# Patient Record
Sex: FEMALE | Race: WHITE | Employment: FULL TIME | URBAN - METROPOLITAN AREA
[De-identification: names, ages, dates, MRNs, and addresses within clinical notes are randomized per-mention and may not be internally consistent; named-entity substitution may affect disease eponyms.]

---

## 2019-06-04 ENCOUNTER — HOSPITAL ENCOUNTER (OUTPATIENT)
Dept: GENERAL RADIOLOGY | Age: 29
Discharge: HOME | End: 2019-06-04
Payer: COMMERCIAL

## 2019-06-04 DIAGNOSIS — N97.0 INFERTILITY ASSOCIATED WITH ANOVULATION: ICD-10-CM

## 2019-06-04 PROCEDURE — 74740 X-RAY FEMALE GENITAL TRACT: CPT

## 2019-06-04 PROCEDURE — 6360000004 HC RX CONTRAST MEDICATION: Performed by: OBSTETRICS & GYNECOLOGY

## 2019-06-04 RX ADMIN — IOHEXOL 20 ML: 240 INJECTION, SOLUTION INTRATHECAL; INTRAVASCULAR; INTRAVENOUS; ORAL at 12:32

## 2019-06-04 NOTE — OP NOTE
[facility]    Betadine applied to cervix. Balloon Catheter placed. Optiray 2000 dye injected. Fluoro reveals normal cavity and bilateral patent tubes.

## 2020-02-17 ENCOUNTER — HOSPITAL ENCOUNTER (OUTPATIENT)
Age: 30
Discharge: HOME | End: 2020-02-17
Attending: ADVANCED PRACTICE MIDWIFE | Admitting: ADVANCED PRACTICE MIDWIFE
Payer: COMMERCIAL

## 2020-02-17 VITALS
TEMPERATURE: 98.1 F | DIASTOLIC BLOOD PRESSURE: 73 MMHG | RESPIRATION RATE: 18 BRPM | HEART RATE: 95 BPM | SYSTOLIC BLOOD PRESSURE: 127 MMHG

## 2020-02-17 PROBLEM — O99.612 CONSTIPATION DURING PREGNANCY IN SECOND TRIMESTER: Status: ACTIVE | Noted: 2020-02-17

## 2020-02-17 PROBLEM — K59.00 CONSTIPATION DURING PREGNANCY IN SECOND TRIMESTER: Status: ACTIVE | Noted: 2020-02-17

## 2020-02-17 PROCEDURE — 99203 OFFICE O/P NEW LOW 30 MIN: CPT

## 2020-02-17 PROCEDURE — 2580000003 HC RX 258: Performed by: ADVANCED PRACTICE MIDWIFE

## 2020-02-17 PROCEDURE — 6370000000 HC RX 637 (ALT 250 FOR IP): Performed by: ADVANCED PRACTICE MIDWIFE

## 2020-02-17 PROCEDURE — 6360000002 HC RX W HCPCS: Performed by: ADVANCED PRACTICE MIDWIFE

## 2020-02-17 PROCEDURE — 96374 THER/PROPH/DIAG INJ IV PUSH: CPT

## 2020-02-17 PROCEDURE — 96360 HYDRATION IV INFUSION INIT: CPT

## 2020-02-17 RX ORDER — METOCLOPRAMIDE 10 MG/1
10 TABLET ORAL
Qty: 120 TABLET | Refills: 3 | Status: SHIPPED | OUTPATIENT
Start: 2020-02-17

## 2020-02-17 RX ORDER — SODIUM CHLORIDE, SODIUM LACTATE, POTASSIUM CHLORIDE, CALCIUM CHLORIDE 600; 310; 30; 20 MG/100ML; MG/100ML; MG/100ML; MG/100ML
INJECTION, SOLUTION INTRAVENOUS CONTINUOUS
Status: ACTIVE | OUTPATIENT
Start: 2020-02-17 | End: 2020-02-17

## 2020-02-17 RX ORDER — METOCLOPRAMIDE HYDROCHLORIDE 5 MG/ML
10 INJECTION INTRAMUSCULAR; INTRAVENOUS ONCE
Status: COMPLETED | OUTPATIENT
Start: 2020-02-17 | End: 2020-02-17

## 2020-02-17 RX ORDER — METOCLOPRAMIDE HYDROCHLORIDE 5 MG/ML
10 INJECTION INTRAMUSCULAR; INTRAVENOUS ONCE
Status: DISCONTINUED | OUTPATIENT
Start: 2020-02-17 | End: 2020-02-17

## 2020-02-17 RX ADMIN — SODIUM CHLORIDE, SODIUM LACTATE, POTASSIUM CHLORIDE, AND CALCIUM CHLORIDE: .6; .31; .03; .02 INJECTION, SOLUTION INTRAVENOUS at 10:21

## 2020-02-17 RX ADMIN — METOCLOPRAMIDE 10 MG: 5 INJECTION, SOLUTION INTRAMUSCULAR; INTRAVENOUS at 13:18

## 2020-02-17 RX ADMIN — LIDOCAINE HYDROCHLORIDE: 20 SOLUTION ORAL; TOPICAL at 14:21

## 2020-02-17 SDOH — HEALTH STABILITY: MENTAL HEALTH: HOW OFTEN DO YOU HAVE A DRINK CONTAINING ALCOHOL?: NEVER

## 2020-02-17 NOTE — FLOWSHEET NOTE
Pt reports she got \"a few small pieces out\" when up to the bathroom. Pt states she feels a little better and agreeable to go home. Bernardo Lopez CNM called and updated on results. Order to send soap suds enema home with pt and to have pt repeat tonight or tomorrow if no further results and continue taking dulcolax at home. If no results by tomorrow morning, pt to call office to make appt to be seen.

## 2020-02-17 NOTE — PLAN OF CARE
Problem: Pain:  Goal: Pain level will decrease  Description  Pain level will decrease  2/17/2020 1049 by Bart Wright RN  Outcome: Ongoing  2/17/2020 1048 by Bart Wright RN  Outcome: Ongoing  Goal: Control of acute pain  Description  Control of acute pain  2/17/2020 1049 by Bart Wright RN  Outcome: Ongoing  2/17/2020 1048 by Bart Wright RN  Outcome: Ongoing     Problem:  Bowel/Gastric:  Goal: Ability to achieve a regular elimination pattern will improve  Description  Ability to achieve a regular elimination pattern will improve  2/17/2020 1049 by Bart Wright RN  Outcome: Ongoing  2/17/2020 1048 by Bart Wright RN  Outcome: Ongoing     Problem: Nausea/Vomiting:  Goal: Absence of nausea/vomiting  Description  Absence of nausea/vomiting  Outcome: Ongoing

## 2020-02-17 NOTE — FLOWSHEET NOTE
Pt discharged to home undelivered in stable condition. Instructions reviewed. Pt verbalizes understanding.   To follow up at next scheduled appointment with [facility]

## 2020-02-17 NOTE — FLOWSHEET NOTE
Soap suds enema provided to pt and education given - pt verbalized understanding of how to use and when to repeat it. Pt states \" I was able to get up again and go a little more and now my belly is growling and I', hesitant to get in the car for a 45 min car ride in case I still need to go\". Discussed with pt she can stay for as long as needed until she is ready to go. Pt provided ice chips as requested on positioned onto left side. Pt to call out when ready for d/c.

## 2020-02-17 NOTE — FLOWSHEET NOTE
Pt states she wants to go home. States cramping is still present, but is not able to relax here and would feel better if able to sleep in own bed. Notified Laila BLAKE pt requesting discharge. Order received for pt to home home.

## 2020-02-17 NOTE — FLOWSHEET NOTE
PT arrived to triage D from home. Genene Later CNM called prior to arrival with orders to give 1 liter bolus of fluid as pt has been on zofran pump at home and [address] [place] [address] Way unable to come to house for fluids today.

## 2020-02-17 NOTE — FLOWSHEET NOTE
Pt reports her biggest complaint is her abd pain r/t constipation. Pt states she has tried MOM, stool softener, dulcolax po and suppository, and fleets enema with no relief. Michi Haile CNM called and informed and agreed to order for soap suds enema. Pt updated on order to offer and pt agreeable to enema. Order placed and central supply called to send to unit. Will complete IV bolus and then do enema.

## 2020-02-17 NOTE — PROGRESS NOTES
Department of Obstetrics and Gynecology  Triage Evaluation Note    SUBJECTIVE:  Debbie Cleary is a 30 y.o.,  at 18w4d who presents for abdominal pain. She denies vaginal bleeding, leakage of fluid, or contractions. She states the baby is  moving well. She denies fever, shortness of breath, palpitations on ROS. Reports vomiting approx nine times yesterday. Has been on German pump at home for the past 6 wks. Has had issues with constipation and cannot report the last time she had she had a full bowel movement. Stopped her Zofran pump yesterday due to constipation    I personally reviewed the past medical and surgical histories, as well as the problem list.    OBJECTIVE:  Vital Signs: /73   Pulse 95   Temp 98.1 °F (36.7 °C) (Oral)   Resp 18   Appearance/Psychiatric: alert and oriented X3  Constitutional: Appears well, no distress  Cardiovascular: She does not have edema. Respiratory:Respiratory effort is normal.  Gastrointestinal: soft, non tender, Gravid. The uterine size is equal to dates. Pelvic: Cervix cl/th/high retroverted uterus. +FHT    LABS:      ASSESSMENT AND PLAN:  30 y.o.    18wks  Hyperemsis  Constipation  P: Reports trying docolax, Miralax, MOM, Doculax suppositories and a Fleets enema at home. Soap suds enema here with some relief. Reglan 10mg IM given  GI cocktail now    Off Zofran pump for now. Start reglan 10mg. Will send reglan to pharmacy. The patient will follow up in 1 weeks. She was counseled to call or return for vaginal bleeding, regular contractions, leakage of fluid or decreased fetal movement.     Electronically signed by LUCAS Mckenna CNM on 2020 at 1:20 PM

## 2020-06-05 ENCOUNTER — HOSPITAL ENCOUNTER (OUTPATIENT)
Dept: VASCULAR LAB | Age: 30
Discharge: HOME | End: 2020-06-05
Payer: COMMERCIAL

## 2020-06-05 PROCEDURE — 93971 EXTREMITY STUDY: CPT

## 2022-09-07 LAB
ABO, EXTERNAL RESULT: NORMAL
HEP B, EXTERNAL RESULT: NEGATIVE
HIV, EXTERNAL RESULT: NORMAL
RPR, EXTERNAL RESULT: NORMAL
RUBELLA TITER, EXTERNAL RESULT: POSITIVE

## 2022-10-12 ENCOUNTER — HOSPITAL ENCOUNTER (OUTPATIENT)
Age: 32
Discharge: HOME | End: 2022-10-12
Attending: OBSTETRICS & GYNECOLOGY | Admitting: OBSTETRICS & GYNECOLOGY
Payer: COMMERCIAL

## 2022-10-12 VITALS
BODY MASS INDEX: 27.66 KG/M2 | RESPIRATION RATE: 18 BRPM | OXYGEN SATURATION: 99 % | WEIGHT: 162 LBS | SYSTOLIC BLOOD PRESSURE: 115 MMHG | HEIGHT: 64 IN | HEART RATE: 71 BPM | DIASTOLIC BLOOD PRESSURE: 68 MMHG | TEMPERATURE: 98.2 F

## 2022-10-12 PROCEDURE — 2580000003 HC RX 258: Performed by: OBSTETRICS & GYNECOLOGY

## 2022-10-12 PROCEDURE — 96361 HYDRATE IV INFUSION ADD-ON: CPT

## 2022-10-12 PROCEDURE — G0379 DIRECT REFER HOSPITAL OBSERV: HCPCS

## 2022-10-12 PROCEDURE — 99215 OFFICE O/P EST HI 40 MIN: CPT

## 2022-10-12 PROCEDURE — 96365 THER/PROPH/DIAG IV INF INIT: CPT

## 2022-10-12 PROCEDURE — 6360000002 HC RX W HCPCS: Performed by: OBSTETRICS & GYNECOLOGY

## 2022-10-12 PROCEDURE — G0378 HOSPITAL OBSERVATION PER HR: HCPCS

## 2022-10-12 RX ORDER — SODIUM CHLORIDE, SODIUM LACTATE, POTASSIUM CHLORIDE, CALCIUM CHLORIDE 600; 310; 30; 20 MG/100ML; MG/100ML; MG/100ML; MG/100ML
INJECTION, SOLUTION INTRAVENOUS ONCE
Status: COMPLETED | OUTPATIENT
Start: 2022-10-12 | End: 2022-10-12

## 2022-10-12 RX ORDER — DEXTROSE, SODIUM CHLORIDE, SODIUM LACTATE, POTASSIUM CHLORIDE, AND CALCIUM CHLORIDE 5; .6; .31; .03; .02 G/100ML; G/100ML; G/100ML; G/100ML; G/100ML
INJECTION, SOLUTION INTRAVENOUS ONCE
Status: COMPLETED | OUTPATIENT
Start: 2022-10-12 | End: 2022-10-12

## 2022-10-12 RX ADMIN — SODIUM CHLORIDE, POTASSIUM CHLORIDE, SODIUM LACTATE AND CALCIUM CHLORIDE: 600; 310; 30; 20 INJECTION, SOLUTION INTRAVENOUS at 16:05

## 2022-10-12 RX ADMIN — Medication 25 MG: at 16:39

## 2022-10-12 RX ADMIN — SODIUM CHLORIDE, SODIUM LACTATE, POTASSIUM CHLORIDE, CALCIUM CHLORIDE AND DEXTROSE MONOHYDRATE: 5; 600; 310; 30; 20 INJECTION, SOLUTION INTRAVENOUS at 16:32

## 2022-10-12 NOTE — FLOWSHEET NOTE
Pt is a  at 11.5wk IUP with c/o hyperemesis. Pt verbalized having same problem with last pregnancy. Pt stated that nausea started once pregnancy was confirmed and only got sick a few times. Pt verbalized N/V increased yesterday and \"cant even take water without it coming back up. \" Hx obtained and FHR doppler for 180's. Pt denies HA, upper gastric pain, bleeding, or leaking of fluid. IV started and bolusing per order. Pt aware of need to call a ride r/t phenergan ordered will make her sleepy. Pt verbalized understanding and will call a ride when ready for d/c home.

## 2022-10-12 NOTE — DISCHARGE INSTRUCTIONS
Home Undelivered Discharge Instructions    After completion of the medical screening evaluation, it has been determined that a medical emergency does not exist and the patient is not in active labor. Therefore, the patient may be discharged home. After Discharge Orders:            Diet:  normal diet as tolerated    Rest: normal activity as tolerated\        Diet/Activity/Restrictions:  Regular  Limit caffeine consumption  Increase your fluid intake  Eat small meals-but often. Rise from laying/sitting position to standing slowly. Avoid laying on your back, sidelying positions are best, left side is preferred. Weigh yourself daily and write down what you weigh. If you had a vaginal exam you may have some bloody mucous or brown vaginal discharge. If your doctor/midwife has ordered antibiotics, get it filled right away and take all of the medication as it has been prescribed. When to call your doctor: If you have severe back pain. Period-like cramps that may come and go. May feel like baby is balling up. Low, dull backache, not relieved by rest.  Pressure in your vagina or lower abdomen that may feel like the baby is pushing down. Change in the type or amount of vaginal discharge. Abdominal cramps that may be accompanied by diarrhea. 4-5 uterine contractions in one hour. Fluid leaking from your vagina (may or may not be bloody)  If you have vaginal bleeding. If you have a temperature of 100.6 or more. If your baby has a decrease in activity. Less than 5 times in an hour. If you feel you are not getting better or you are concerned. If you develop a headache, not resolved with your usual interventions. If you have changes in your vision (blurring or spots in your vision). If you have burning with urination, urgency or frequency. If you have pain in your abdomen, up by your ribs.                 General Instructions:    Nausea and Vomiting  Keep dry toast/crackers with you to munch on  Eat small frequent meals  Try to keep something in your stomach (don't let your stomach get empty)  Take your time getting up  Avoid smells that make you feel sick    Travel  Wear seatbelts or safety/lap belts  Walk frequently, every 1-2 hours  Wear clothing that does not constrict and comfortable shoes  Keep a light snack (e.g. Dry crackers) with you at all times to prevent nausea  Drink plenty of water, low sodium and noncaffeinated drinks. DO NOT take any medication that is not approved by your physician first    Swelling (Edema)  Avoid standing for long periods, keep legs up when you can  When resting, lie on your side (left side is best)  Limit the amount of salty foods you eat  Try to wear support hose as much as possible    Exercise  Avoid situations that would cause you to become overheated  Exercise outdoors only if the weather is reasonable and not too hot  Do not over exert yourself when you exercise  Drink plenty of fluids, especially water  Wear good support hose, bra and shoes when exercising    Varicose Veins  Try to keep your legs elevated when you are sitting  When lying down, keep your legs elevated  Do not stand for long periods of time  When wearing stockings or socks, make sure they are not too tight and bind your legs  Wear support hose/stockings at all times  If you have a job where you sit a lot, get up periodically and walk around      Other instructions: Do kick counts once a day on your baby starting in the 3rd trimester. Choose the time of day your baby is most active. Get in a comfortable lying or sitting position and time how long it takes to feel 10 kicks, twists, turns, swishes, or rolls.  Call your physician or midwife if there have not been 10 kicks in 1 hours    Call physician or midwife, return to Labor and Delivery, call 911, or go to the nearest Emergency Room if: increased leakage or fluid, decreased fetal movement, persistent low back pain or cramping, bleeding from vaginal area, difficulty urinating, pain with urination, difficulty breathing, new calf pain, persistent headache, vision change, or contractions. Fetal Movement Chart    A daily diary of your baby's movements provides useful information. Please allyn down anytime the baby moves during at least one convenient hour each day. Pick an hour when the baby is usually active. It is also important that you have a regular meal within two hours. Gurinder Nix on your left side, in this position the circulation to the baby is improved, and count the number of movements. If the baby moves less than 5-6 times in an hour, or you are concerned about you or your baby call your doctor or midwife.         Date Time Counts Total Date Time Counts Total

## 2022-10-12 NOTE — FLOWSHEET NOTE
Medication and IVF infused. Pt verbalized feeling better just tired. Pt ride on their way. IV d/kathie. After completion of the Medical Screening Evaluation, it has been determined that a medical emergency does not exist and the patient is not in active labor, and therefore the patient may be discharged home. Patient given triage discharge instructions. Patient instructed to resume regular diet as tolerated with small frequent meals and  normal activity. All pregnancy warning signs and symptoms reviewed. Fetal Kick counts reviewed and information provided regarding \"Count The Kicks\" eulogio. Patient aware of when to follow-up with provider and all questions answered.

## 2022-10-12 NOTE — FLOWSHEET NOTE
IV#1 in and D5LR hung with phenergan IVPB'ed in. Medication reviewed with pt. Pt verbalized understanding. Pt aware that medication will take 30min to infuse.

## 2023-03-31 LAB — GBS, EXTERNAL RESULT: NEGATIVE

## 2023-04-27 ENCOUNTER — PREP FOR PROCEDURE (OUTPATIENT)
Dept: OBGYN | Age: 33
End: 2023-04-27

## 2023-04-27 RX ORDER — ONDANSETRON 2 MG/ML
4 INJECTION INTRAMUSCULAR; INTRAVENOUS EVERY 6 HOURS PRN
Status: CANCELLED | OUTPATIENT
Start: 2023-04-27

## 2023-04-27 RX ORDER — MISOPROSTOL 100 UG/1
800 TABLET ORAL PRN
Status: CANCELLED | OUTPATIENT
Start: 2023-04-27

## 2023-04-27 RX ORDER — SODIUM CHLORIDE 0.9 % (FLUSH) 0.9 %
5-40 SYRINGE (ML) INJECTION EVERY 12 HOURS SCHEDULED
Status: CANCELLED | OUTPATIENT
Start: 2023-04-27

## 2023-04-27 RX ORDER — SODIUM CHLORIDE, SODIUM LACTATE, POTASSIUM CHLORIDE, AND CALCIUM CHLORIDE .6; .31; .03; .02 G/100ML; G/100ML; G/100ML; G/100ML
500 INJECTION, SOLUTION INTRAVENOUS PRN
Status: CANCELLED | OUTPATIENT
Start: 2023-04-27

## 2023-04-27 RX ORDER — SODIUM CHLORIDE, SODIUM LACTATE, POTASSIUM CHLORIDE, AND CALCIUM CHLORIDE .6; .31; .03; .02 G/100ML; G/100ML; G/100ML; G/100ML
1000 INJECTION, SOLUTION INTRAVENOUS PRN
Status: CANCELLED | OUTPATIENT
Start: 2023-04-27

## 2023-04-27 RX ORDER — SODIUM CHLORIDE 9 MG/ML
25 INJECTION, SOLUTION INTRAVENOUS PRN
Status: CANCELLED | OUTPATIENT
Start: 2023-04-27

## 2023-04-27 RX ORDER — CARBOPROST TROMETHAMINE 250 UG/ML
250 INJECTION, SOLUTION INTRAMUSCULAR PRN
Status: CANCELLED | OUTPATIENT
Start: 2023-04-27

## 2023-04-27 RX ORDER — SODIUM CHLORIDE 0.9 % (FLUSH) 0.9 %
5-40 SYRINGE (ML) INJECTION PRN
Status: CANCELLED | OUTPATIENT
Start: 2023-04-27

## 2023-04-27 RX ORDER — SODIUM CHLORIDE, SODIUM LACTATE, POTASSIUM CHLORIDE, CALCIUM CHLORIDE 600; 310; 30; 20 MG/100ML; MG/100ML; MG/100ML; MG/100ML
INJECTION, SOLUTION INTRAVENOUS CONTINUOUS
Status: CANCELLED | OUTPATIENT
Start: 2023-04-27

## 2023-04-27 RX ORDER — METHYLERGONOVINE MALEATE 0.2 MG/ML
200 INJECTION INTRAVENOUS PRN
Status: CANCELLED | OUTPATIENT
Start: 2023-04-27

## 2023-04-30 ENCOUNTER — ANESTHESIA (OUTPATIENT)
Dept: LABOR AND DELIVERY | Age: 33
End: 2023-04-30
Payer: COMMERCIAL

## 2023-04-30 ENCOUNTER — HOSPITAL ENCOUNTER (INPATIENT)
Age: 33
LOS: 3 days | Discharge: HOME | End: 2023-05-03
Attending: OBSTETRICS & GYNECOLOGY | Admitting: OBSTETRICS & GYNECOLOGY
Payer: COMMERCIAL

## 2023-04-30 ENCOUNTER — ANESTHESIA EVENT (OUTPATIENT)
Dept: LABOR AND DELIVERY | Age: 33
End: 2023-04-30
Payer: COMMERCIAL

## 2023-04-30 ENCOUNTER — APPOINTMENT (OUTPATIENT)
Dept: LABOR AND DELIVERY | Age: 33
End: 2023-04-30
Payer: COMMERCIAL

## 2023-04-30 PROBLEM — Z98.890 STATUS POST INDUCTION OF LABOR: Status: ACTIVE | Noted: 2023-04-30

## 2023-04-30 LAB
ABO + RH BLD: NORMAL
AMPHETAMINES UR QL SCN>1000 NG/ML: NORMAL
BARBITURATES UR QL SCN>200 NG/ML: NORMAL
BENZODIAZ UR QL SCN>200 NG/ML: NORMAL
BLD GP AB SCN SERPL QL: NORMAL
BUPRENORPHINE+NOR UR QL SCN: NORMAL
CANNABINOIDS UR QL SCN>50 NG/ML: NORMAL
COCAINE UR QL SCN: NORMAL
DEPRECATED RDW RBC AUTO: 13.2 % (ref 12.4–15.4)
DRUG SCREEN COMMENT UR-IMP: NORMAL
FENTANYL SCREEN, URINE: NORMAL
HCT VFR BLD AUTO: 34.4 % (ref 36–48)
HGB BLD-MCNC: 11.5 G/DL (ref 12–16)
MCH RBC QN AUTO: 30.3 PG (ref 26–34)
MCHC RBC AUTO-ENTMCNC: 33.6 G/DL (ref 31–36)
MCV RBC AUTO: 90.1 FL (ref 80–100)
METHADONE UR QL SCN>300 NG/ML: NORMAL
OPIATES UR QL SCN>300 NG/ML: NORMAL
OXYCODONE UR QL SCN: NORMAL
PCP UR QL SCN>25 NG/ML: NORMAL
PH UR STRIP: 6 [PH]
PLATELET # BLD AUTO: 135 K/UL (ref 135–450)
PMV BLD AUTO: 10.5 FL (ref 5–10.5)
RBC # BLD AUTO: 3.81 M/UL (ref 4–5.2)
WBC # BLD AUTO: 10.9 K/UL (ref 4–11)

## 2023-04-30 PROCEDURE — 86901 BLOOD TYPING SEROLOGIC RH(D): CPT

## 2023-04-30 PROCEDURE — 86780 TREPONEMA PALLIDUM: CPT

## 2023-04-30 PROCEDURE — 6370000000 HC RX 637 (ALT 250 FOR IP): Performed by: OBSTETRICS & GYNECOLOGY

## 2023-04-30 PROCEDURE — 2580000003 HC RX 258: Performed by: OBSTETRICS & GYNECOLOGY

## 2023-04-30 PROCEDURE — 59025 FETAL NON-STRESS TEST: CPT

## 2023-04-30 PROCEDURE — 86900 BLOOD TYPING SEROLOGIC ABO: CPT

## 2023-04-30 PROCEDURE — 1220000000 HC SEMI PRIVATE OB R&B

## 2023-04-30 PROCEDURE — 85027 COMPLETE CBC AUTOMATED: CPT

## 2023-04-30 PROCEDURE — 59200 INSERT CERVICAL DILATOR: CPT

## 2023-04-30 PROCEDURE — 86850 RBC ANTIBODY SCREEN: CPT

## 2023-04-30 PROCEDURE — 80307 DRUG TEST PRSMV CHEM ANLYZR: CPT

## 2023-04-30 RX ORDER — SODIUM CHLORIDE, SODIUM LACTATE, POTASSIUM CHLORIDE, AND CALCIUM CHLORIDE .6; .31; .03; .02 G/100ML; G/100ML; G/100ML; G/100ML
1000 INJECTION, SOLUTION INTRAVENOUS PRN
Status: DISCONTINUED | OUTPATIENT
Start: 2023-04-30 | End: 2023-05-03 | Stop reason: HOSPADM

## 2023-04-30 RX ORDER — MISOPROSTOL 200 UG/1
800 TABLET ORAL PRN
Status: DISCONTINUED | OUTPATIENT
Start: 2023-04-30 | End: 2023-05-03 | Stop reason: HOSPADM

## 2023-04-30 RX ORDER — METHYLERGONOVINE MALEATE 0.2 MG/ML
200 INJECTION INTRAVENOUS PRN
Status: DISCONTINUED | OUTPATIENT
Start: 2023-04-30 | End: 2023-05-03 | Stop reason: HOSPADM

## 2023-04-30 RX ORDER — SODIUM CHLORIDE 9 MG/ML
25 INJECTION, SOLUTION INTRAVENOUS PRN
Status: DISCONTINUED | OUTPATIENT
Start: 2023-04-30 | End: 2023-05-03 | Stop reason: HOSPADM

## 2023-04-30 RX ORDER — SODIUM CHLORIDE, SODIUM LACTATE, POTASSIUM CHLORIDE, AND CALCIUM CHLORIDE .6; .31; .03; .02 G/100ML; G/100ML; G/100ML; G/100ML
500 INJECTION, SOLUTION INTRAVENOUS PRN
Status: DISCONTINUED | OUTPATIENT
Start: 2023-04-30 | End: 2023-05-03 | Stop reason: HOSPADM

## 2023-04-30 RX ORDER — SODIUM CHLORIDE 0.9 % (FLUSH) 0.9 %
5-40 SYRINGE (ML) INJECTION EVERY 12 HOURS SCHEDULED
Status: DISCONTINUED | OUTPATIENT
Start: 2023-04-30 | End: 2023-05-03 | Stop reason: HOSPADM

## 2023-04-30 RX ORDER — SODIUM CHLORIDE, SODIUM LACTATE, POTASSIUM CHLORIDE, CALCIUM CHLORIDE 600; 310; 30; 20 MG/100ML; MG/100ML; MG/100ML; MG/100ML
INJECTION, SOLUTION INTRAVENOUS CONTINUOUS
Status: DISCONTINUED | OUTPATIENT
Start: 2023-04-30 | End: 2023-05-01 | Stop reason: SDUPTHER

## 2023-04-30 RX ORDER — SODIUM CHLORIDE 0.9 % (FLUSH) 0.9 %
5-40 SYRINGE (ML) INJECTION PRN
Status: DISCONTINUED | OUTPATIENT
Start: 2023-04-30 | End: 2023-05-03 | Stop reason: HOSPADM

## 2023-04-30 RX ORDER — CARBOPROST TROMETHAMINE 250 UG/ML
250 INJECTION, SOLUTION INTRAMUSCULAR PRN
Status: DISCONTINUED | OUTPATIENT
Start: 2023-04-30 | End: 2023-05-03 | Stop reason: HOSPADM

## 2023-04-30 RX ORDER — ONDANSETRON 2 MG/ML
4 INJECTION INTRAMUSCULAR; INTRAVENOUS EVERY 6 HOURS PRN
Status: DISCONTINUED | OUTPATIENT
Start: 2023-04-30 | End: 2023-05-03 | Stop reason: HOSPADM

## 2023-04-30 RX ADMIN — SODIUM CHLORIDE, POTASSIUM CHLORIDE, SODIUM LACTATE AND CALCIUM CHLORIDE: 600; 310; 30; 20 INJECTION, SOLUTION INTRAVENOUS at 23:02

## 2023-04-30 RX ADMIN — Medication 25 MCG: at 20:00

## 2023-04-30 RX ADMIN — SODIUM CHLORIDE, POTASSIUM CHLORIDE, SODIUM LACTATE AND CALCIUM CHLORIDE: 600; 310; 30; 20 INJECTION, SOLUTION INTRAVENOUS at 19:54

## 2023-04-30 ASSESSMENT — ENCOUNTER SYMPTOMS: SHORTNESS OF BREATH: 0

## 2023-05-01 LAB — REAGIN+T PALLIDUM IGG+IGM SERPL-IMP: NORMAL

## 2023-05-01 PROCEDURE — 1220000000 HC SEMI PRIVATE OB R&B

## 2023-05-01 PROCEDURE — 3E033VJ INTRODUCTION OF OTHER HORMONE INTO PERIPHERAL VEIN, PERCUTANEOUS APPROACH: ICD-10-PCS | Performed by: OBSTETRICS & GYNECOLOGY

## 2023-05-01 PROCEDURE — 3E0DXGC INTRODUCTION OF OTHER THERAPEUTIC SUBSTANCE INTO MOUTH AND PHARYNX, EXTERNAL APPROACH: ICD-10-PCS | Performed by: OBSTETRICS & GYNECOLOGY

## 2023-05-01 PROCEDURE — 10907ZC DRAINAGE OF AMNIOTIC FLUID, THERAPEUTIC FROM PRODUCTS OF CONCEPTION, VIA NATURAL OR ARTIFICIAL OPENING: ICD-10-PCS | Performed by: OBSTETRICS & GYNECOLOGY

## 2023-05-01 PROCEDURE — 2500000003 HC RX 250 WO HCPCS: Performed by: NURSE ANESTHETIST, CERTIFIED REGISTERED

## 2023-05-01 PROCEDURE — 59025 FETAL NON-STRESS TEST: CPT

## 2023-05-01 PROCEDURE — 2500000003 HC RX 250 WO HCPCS

## 2023-05-01 PROCEDURE — 51701 INSERT BLADDER CATHETER: CPT

## 2023-05-01 PROCEDURE — 7200000001 HC VAGINAL DELIVERY

## 2023-05-01 PROCEDURE — 3700000025 EPIDURAL BLOCK: Performed by: ANESTHESIOLOGY

## 2023-05-01 PROCEDURE — 6360000002 HC RX W HCPCS: Performed by: NURSE ANESTHETIST, CERTIFIED REGISTERED

## 2023-05-01 PROCEDURE — 6360000002 HC RX W HCPCS: Performed by: OBSTETRICS & GYNECOLOGY

## 2023-05-01 PROCEDURE — 6370000000 HC RX 637 (ALT 250 FOR IP): Performed by: OBSTETRICS & GYNECOLOGY

## 2023-05-01 PROCEDURE — 2500000003 HC RX 250 WO HCPCS: Performed by: ANESTHESIOLOGY

## 2023-05-01 PROCEDURE — 59200 INSERT CERVICAL DILATOR: CPT

## 2023-05-01 PROCEDURE — 0KQM0ZZ REPAIR PERINEUM MUSCLE, OPEN APPROACH: ICD-10-PCS | Performed by: OBSTETRICS & GYNECOLOGY

## 2023-05-01 RX ORDER — FENTANYL CITRATE 50 UG/ML
INJECTION, SOLUTION INTRAMUSCULAR; INTRAVENOUS
Status: COMPLETED
Start: 2023-05-01 | End: 2023-05-01

## 2023-05-01 RX ORDER — SODIUM CHLORIDE, SODIUM LACTATE, POTASSIUM CHLORIDE, CALCIUM CHLORIDE 600; 310; 30; 20 MG/100ML; MG/100ML; MG/100ML; MG/100ML
INJECTION, SOLUTION INTRAVENOUS CONTINUOUS
Status: DISCONTINUED | OUTPATIENT
Start: 2023-05-01 | End: 2023-05-03 | Stop reason: HOSPADM

## 2023-05-01 RX ORDER — NALOXONE HYDROCHLORIDE 0.4 MG/ML
INJECTION, SOLUTION INTRAMUSCULAR; INTRAVENOUS; SUBCUTANEOUS PRN
Status: DISCONTINUED | OUTPATIENT
Start: 2023-05-01 | End: 2023-05-02 | Stop reason: HOSPADM

## 2023-05-01 RX ORDER — BUPIVACAINE HYDROCHLORIDE 5 MG/ML
INJECTION, SOLUTION EPIDURAL; INTRACAUDAL PRN
Status: DISCONTINUED | OUTPATIENT
Start: 2023-05-01 | End: 2023-05-01 | Stop reason: SDUPTHER

## 2023-05-01 RX ORDER — BUPIVACAINE HYDROCHLORIDE 2.5 MG/ML
INJECTION, SOLUTION EPIDURAL; INFILTRATION; INTRACAUDAL PRN
Status: DISCONTINUED | OUTPATIENT
Start: 2023-05-01 | End: 2023-05-01 | Stop reason: SDUPTHER

## 2023-05-01 RX ORDER — SODIUM CHLORIDE 0.9 % (FLUSH) 0.9 %
5-40 SYRINGE (ML) INJECTION PRN
Status: DISCONTINUED | OUTPATIENT
Start: 2023-05-01 | End: 2023-05-03 | Stop reason: HOSPADM

## 2023-05-01 RX ORDER — LANOLIN 100 %
OINTMENT (GRAM) TOPICAL PRN
Status: DISCONTINUED | OUTPATIENT
Start: 2023-05-01 | End: 2023-05-03 | Stop reason: HOSPADM

## 2023-05-01 RX ORDER — SODIUM CHLORIDE 9 MG/ML
INJECTION, SOLUTION INTRAVENOUS PRN
Status: DISCONTINUED | OUTPATIENT
Start: 2023-05-01 | End: 2023-05-01 | Stop reason: SDUPTHER

## 2023-05-01 RX ORDER — DOCUSATE SODIUM 100 MG/1
100 CAPSULE, LIQUID FILLED ORAL 2 TIMES DAILY
Status: DISCONTINUED | OUTPATIENT
Start: 2023-05-01 | End: 2023-05-03 | Stop reason: HOSPADM

## 2023-05-01 RX ORDER — FENTANYL CITRATE 50 UG/ML
INJECTION, SOLUTION INTRAMUSCULAR; INTRAVENOUS PRN
Status: DISCONTINUED | OUTPATIENT
Start: 2023-05-01 | End: 2023-05-01 | Stop reason: SDUPTHER

## 2023-05-01 RX ORDER — IBUPROFEN 600 MG/1
600 TABLET ORAL EVERY 8 HOURS PRN
Status: DISCONTINUED | OUTPATIENT
Start: 2023-05-01 | End: 2023-05-03 | Stop reason: HOSPADM

## 2023-05-01 RX ORDER — LIDOCAINE HYDROCHLORIDE AND EPINEPHRINE 15; 5 MG/ML; UG/ML
INJECTION, SOLUTION EPIDURAL PRN
Status: DISCONTINUED | OUTPATIENT
Start: 2023-05-01 | End: 2023-05-01 | Stop reason: SDUPTHER

## 2023-05-01 RX ORDER — OXYCODONE HYDROCHLORIDE 5 MG/1
5 TABLET ORAL EVERY 4 HOURS PRN
Status: DISCONTINUED | OUTPATIENT
Start: 2023-05-01 | End: 2023-05-03 | Stop reason: HOSPADM

## 2023-05-01 RX ORDER — ACETAMINOPHEN 500 MG
1000 TABLET ORAL EVERY 8 HOURS PRN
Status: DISCONTINUED | OUTPATIENT
Start: 2023-05-01 | End: 2023-05-03 | Stop reason: HOSPADM

## 2023-05-01 RX ORDER — SODIUM CHLORIDE 0.9 % (FLUSH) 0.9 %
5-40 SYRINGE (ML) INJECTION EVERY 12 HOURS SCHEDULED
Status: DISCONTINUED | OUTPATIENT
Start: 2023-05-01 | End: 2023-05-03 | Stop reason: HOSPADM

## 2023-05-01 RX ADMIN — BUPIVACAINE HYDROCHLORIDE 5 ML: 5 INJECTION, SOLUTION EPIDURAL; INTRACAUDAL at 15:02

## 2023-05-01 RX ADMIN — Medication 12 ML/HR: at 05:49

## 2023-05-01 RX ADMIN — LIDOCAINE HYDROCHLORIDE AND EPINEPHRINE 3 ML: 15; 5 INJECTION, SOLUTION EPIDURAL at 05:34

## 2023-05-01 RX ADMIN — BUPIVACAINE HYDROCHLORIDE 5 ML: 2.5 INJECTION, SOLUTION EPIDURAL; INFILTRATION; INTRACAUDAL at 06:20

## 2023-05-01 RX ADMIN — Medication 87.3 MILLI-UNITS/MIN: at 18:40

## 2023-05-01 RX ADMIN — BUPIVACAINE HYDROCHLORIDE 3 ML: 5 INJECTION, SOLUTION EPIDURAL; INTRACAUDAL at 17:19

## 2023-05-01 RX ADMIN — FENTANYL CITRATE 100 MCG: 50 INJECTION INTRAMUSCULAR; INTRAVENOUS at 17:19

## 2023-05-01 RX ADMIN — Medication 5 ML: at 05:39

## 2023-05-01 RX ADMIN — Medication 12 ML/HR: at 13:19

## 2023-05-01 RX ADMIN — ONDANSETRON 4 MG: 2 INJECTION INTRAMUSCULAR; INTRAVENOUS at 16:21

## 2023-05-01 RX ADMIN — Medication 5 ML: at 05:46

## 2023-05-01 RX ADMIN — Medication 1 MILLI-UNITS/MIN: at 04:37

## 2023-05-01 RX ADMIN — Medication 25 MCG: at 00:00

## 2023-05-01 RX ADMIN — Medication 10 UNITS: at 18:40

## 2023-05-01 NOTE — FLOWSHEET NOTE
Report received from ZURI Pelayo RN, this RN to assume care at this time. Plan of care reviewed with pt, pt agreeable. RN remains at bedside.

## 2023-05-01 NOTE — PLAN OF CARE
Problem: Pain  Goal: Verbalizes/displays adequate comfort level or baseline comfort level  Outcome: Progressing     Problem: Postpartum  Goal: Experiences normal postpartum course  Description:  Postpartum OB-Pregnancy care plan goal which identifies if the mother is experiencing a normal postpartum course  Outcome: Progressing  Goal: Appropriate maternal -  bonding  Description:  Postpartum OB-Pregnancy care plan goal which identifies if the mother and  are bonding appropriately  Outcome: Progressing  Goal: Establishment of infant feeding pattern  Description:  Postpartum OB-Pregnancy care plan goal which identifies if the mother is establishing a feeding pattern with their   Outcome: Progressing  Goal: Incisions, wounds, or drain sites healing without S/S of infection  Outcome: Progressing     Problem: Infection - Adult  Goal: Absence of infection at discharge  Outcome: Progressing  Goal: Absence of infection during hospitalization  Outcome: Progressing  Goal: Absence of fever/infection during anticipated neutropenic period  Outcome: Progressing     Problem: Discharge Planning  Goal: Discharge to home or other facility with appropriate resources  Outcome: Progressing     Problem: Chronic Conditions and Co-morbidities  Goal: Patient's chronic conditions and co-morbidity symptoms are monitored and maintained or improved  Outcome: Progressing

## 2023-05-01 NOTE — FLOWSHEET NOTE
Patient urine output 25mL the past two times that she as straight cathed. Dr Manior Lopez made aware of finding. Will continue to monitor.

## 2023-05-01 NOTE — FLOWSHEET NOTE
RN to bedside. Pt up and ambulated independently to bathroom. Voided 800ml of clear yellow urine w/o difficulty. SVE preformed (1/50/-3), 2nd Cytotec placed @ 0000. Pt tolerated well.

## 2023-05-01 NOTE — PLAN OF CARE
Problem: Pain  Goal: Verbalizes/displays adequate comfort level or baseline comfort level  Outcome: Progressing  Flowsheets (Taken 2023)  Verbalizes/displays adequate comfort level or baseline comfort level: Encourage patient to monitor pain and request assistance     Problem: Vaginal Birth or  Section  Goal: Fetal and maternal status remain reassuring during the birth process  Description:  Birth OB-Pregnancy care plan goal which identifies if the fetal and maternal status remain reassuring during the birth process  Outcome: Progressing     Problem: Infection - Adult  Goal: Absence of infection at discharge  Outcome: Progressing  Goal: Absence of infection during hospitalization  Outcome: Progressing  Goal: Absence of fever/infection during anticipated neutropenic period  Outcome: Progressing     Problem: Discharge Planning  Goal: Discharge to home or other facility with appropriate resources  Outcome: Progressing     Problem: Chronic Conditions and Co-morbidities  Goal: Patient's chronic conditions and co-morbidity symptoms are monitored and maintained or improved  Outcome: Progressing

## 2023-05-01 NOTE — ANESTHESIA PROCEDURE NOTES
Epidural Block    Patient location during procedure: OB  Start time: 5/1/2023 5:26 AM  End time: 5/1/2023 5:50 AM  Reason for block: labor epidural  Staffing  Performed: resident/CRNA   Anesthesiologist: Anuhsa Youssef MD  Resident/CRNA: Cherelle Hewitt, APRN - CRNA  Epidural  Patient position: sitting  Prep: ChloraPrep and site prepped and draped  Patient monitoring: continuous pulse ox and frequent blood pressure checks  Approach: midline  Location: L3-4  Injection technique: EVELINE saline  Provider prep: mask and sterile gloves  Needle  Needle type: Tuohy   Needle gauge: 17 G  Needle length: 3.5 in  Needle insertion depth: 6 cm  Catheter type: multi-orifice  Catheter size: 19 G  Catheter at skin depth: 12 cm  Test dose: negativeCatheter Secured: tape and tegaderm  Assessment  Sensory level: T10  Hemodynamics: stable  Attempts: 1  Outcomes: uncomplicated and patient tolerated procedure well  Additional Notes  Consent:  Risks and benefits of the labor epidural were discussed and the patient was given the opportunity to ask questions. Informed consent was obtained. Timeout:  A \"time out\" was performed immediately prior to the start of the epidural procedure. The patient, site, procedure and provider were correctly identified. Allergies were reviewed. All members of the team and the patient participated in the time out. Procedure  Skin wheal with Lidocaine 1% 2 mL @ L3-L4. Loss of resistance with 1 mL of normal saline to expand the epidural space. denies paresthesia. Intentional dural puncture (DPE technique) with 25 G Pencan spinal needle. CSF  positive, Blood: negative. Spinal needle removed and epidural catheter threaded without difficulty. Test dose negative with (3ml 1. 5%Lidocaine with 1:200,000 Epinephrine)  Occlusive dressing; steri strips, tegaderm and tape applied using aseptic technique.     Attempts: 1   Difficulties/Complications: None    The patient was returned to the supine position with her

## 2023-05-01 NOTE — FLOWSHEET NOTE
04/30/23 2000   Fetal Heart Rate   Mode External US   Baseline Rate 160 bpm   Baseline Classification Normal   Variability 6-25 BPM   Pattern Accelerations   Patient Feels Fetal Movement Yes   Interventions RN at Bedside; [place] Adjusted;Ultrasound Adjusted;Positioned on Left Side;Sterile Vaginal Exam Performed   OB Bladder Status Non-distended;Voiding   OB Bladder Intervention Voiding with Relief   Multiple birth? No   Fetal Monitoring Strip   FMS Reviewed? Yes   FMS Reviewed By? SP   Uterine Activity   UA Mode Palpation; [place]   Contraction Frequency none   Contraction Duration n/a   Contraction Intensity N/A   Resting Tone Palpated Soft   Cervical Exam   Dilation (cm) 1   Effacement [place] (Labor [employer] Graph) 8   OB Examiner Ralph JOHNSON     NST

## 2023-05-01 NOTE — H&P
Department of Obstetrics and Gynecology   Obstetrics History and Physical        CHIEF COMPLAINT:  induction    HISTORY OF PRESENT ILLNESS:    Alfred Carpenter  is a 27 y.o.  female at 44w3d presents with a chief complaint as above and is being admitted for induction    Estimated Due Date: Estimated Date of Delivery: 23    PRENATAL CARE: Complicated by: baby with dilated right renal pelvis, 1.1 cm. PAST OB HISTORY:  OB History          2    Para   1    Term   1            AB        Living   1         SAB        IAB        Ectopic        Molar        Multiple        Live Births   1              Past Medical History:        Diagnosis Date    Anemia     Hyperemesis gravidarum      Past Surgical History:        Procedure Laterality Date    BREAST SURGERY      reduction    WISDOM TOOTH EXTRACTION Bilateral 2013     Allergies:   Ancef [cefazolin]  Social History:    Social History     Socioeconomic History    Marital status:      Spouse name: Not on file    Number of children: Not on file    Years of education: Not on file    Highest education level: Not on file   Occupational History    Not on file   Tobacco Use    Smoking status: Never    Smokeless tobacco: Never   Vaping Use    Vaping Use: Never used   Substance and Sexual Activity    Alcohol use: Never    Drug use: Never    Sexual activity: Yes     Partners: Male   Other Topics Concern    Not on file   Social History Narrative    Not on file     Social Determinants of Health     Financial Resource Strain: Not on file   Food Insecurity: Not on file   Transportation Needs: Not on file   Physical Activity: Not on file   Stress: Not on file   Social Connections: Not on file   Intimate Partner Violence: Not on file   Housing Stability: Not on file     Family History:       Problem Relation Age of Onset    Anemia Mother     High Blood Pressure Father     Breast Cancer Paternal Grandmother      Medications Prior to Admission:  Medications

## 2023-05-01 NOTE — FLOWSHEET NOTE
RN at bedside. SVE preformed (2/50/-3). Pt tolerated well.  notified of SVE and contraction frequency. Orders to start Standard Ordered Pitocin.

## 2023-05-01 NOTE — FLOWSHEET NOTE
RN and Rajwinder RAMON at bedside. Consents verified. Time out preformed. Pt positioned @ 0524  Time out @ 0526  Epidural placed @ 0533  Test dose given @ 0534  /73  HR 93  Pt positioned with a right sided tilt.

## 2023-05-01 NOTE — FLOWSHEET NOTE
RN to bedside. Pitocin Started. Education given, all questions answered at this time. Pt rating contraction pain 6-7/10. MD okay with pt receiving epidural when ready. Call light and bedside table placed within reach. Instructed to call with any needs.

## 2023-05-01 NOTE — FLOWSHEET NOTE
RN to bedside. Pt rating pain 7/10 with contractions. Pt requesting epidural. MD okay with this. [facility] notified. IVF bolus started. RN remains at bedside.

## 2023-05-01 NOTE — ANESTHESIA PRE PROCEDURE
Department of Anesthesiology  Preprocedure Note       Name:  Silvestre Padilla   Age:  27 y.o.  :  1990                                          MRN:  [id number]         Date:  2023      Procedure: Labor Epidural    Medications prior to admission:   Prior to Admission medications    Medication Sig Start Date End Date Taking?  Authorizing Provider   metoclopramide (REGLAN) 10 MG tablet Take 1 tablet by mouth 3 times daily (with meals)  Patient not taking: Reported on 2023   LUCAS Carey CNM       Current medications:    Current Facility-Administered Medications   Medication Dose Route Frequency Provider Last Rate Last Admin    0.9 % sodium chloride infusion  25 mL IntraVENous PRN Isak Calhoun MD        methylergonovine (METHERGINE) injection 200 mcg  200 mcg IntraMUSCular PRN Isak Calhoun MD        carboprost (HEMABATE) injection 250 mcg  250 mcg IntraMUSCular PRN Isak Calhoun MD        miSOPROStol (CYTOTEC) tablet 800 mcg  800 mcg Rectal PRN Isak Calhoun MD        tranexamic acid (CYCLOKAPRON) 1000 mg in sodium chloride 0.9 % 50 mL IVPB  1,000 mg IntraVENous Once PRN Isak Calhoun MD        oxytocin (PITOCIN) 30 units in 500 mL infusion  87.3 julio-units/min IntraVENous Continuous PRN Isak Calhoun MD        And    oxytocin (PITOCIN) 30 units in 500 mL infusion  10 Units IntraVENous PRN Isak Calhoun MD        ondansetron [facility]) injection 4 mg  4 mg IntraVENous Q6H PRN Isak Calhoun MD        miSOPROStol (CYTOTEC) pre-split tablet TABS 25 mcg  25 mcg Vaginal Q4H Isak Calhoun MD   25 mcg at 23    lactated ringers IV soln infusion   IntraVENous Continuous Isak Calhoun  mL/hr at 23 New Bag at 23    lactated ringers bolus  500 mL IntraVENous PRN Isak Calhoun MD        Or    lactated ringers bolus  1,000 mL IntraVENous

## 2023-05-01 NOTE — FLOWSHEET NOTE
05/01/23 0200   Fetal Heart Rate   Mode External US   Baseline Rate 130 bpm   Baseline Classification Normal   Variability 6-25 BPM   Pattern Accelerations   Patient Feels Fetal Movement Yes   Interventions Positioned on Left Side; [place] Adjusted;Ultrasound Adjusted;RN at Bedside   OB Bladder Status Non-distended;Voiding   OB Bladder Intervention Voiding with Relief   Multiple birth? No   Fetal Monitoring Strip   FMS Reviewed? Yes   FMS Reviewed By? sp   Uterine Activity   UA Mode Palpation; [place]   Contraction Frequency 1-6   Contraction Duration    Contraction Intensity Mild   Resting Tone Palpated Soft     NST

## 2023-05-01 NOTE — FLOWSHEET NOTE
delivery of viable male infant. Cord clamped and cut per MD. Infant placed on mother's abdomen, dried and stimulated with spontaneous cry. Infant placed skin to skin. Warm blanket, hat, and diaper applied. Apgars 8/9 . Laceration second degree. .

## 2023-05-01 NOTE — FLOWSHEET NOTE
RN to bedside. SVE preformed (1/40/-3). First Cytotec placed @ 2000. Pt tolerated well. Evening assessment completed. VSS. Pt rating pain 3/10. Stating feeling lower abdomen cramping.

## 2023-05-01 NOTE — FLOWSHEET NOTE
Pt stating, \"feeling much better\" after epidural placement. VSS. Bedside table and call light placed within reach. Instructed to call with any questions/ needs.

## 2023-05-01 NOTE — L&D DELIVERY SUMMARY NOTE
Department of Obstetrics and Gynecology  Spontaneous Vaginal Delivery Note      Pre-operative Diagnosis:  Term pregnancy and Induced labor    Post-operative Diagnosis:  Living  infant(s) and Male    Information for the patient's :  Gaby Vizcarra [[id number]]                  Infant Wt:   Information for the patient's :  Gaby Vizcarra [[id number]]         APGARS:     Information for the patient's :  Gaby Vizcarra [[id number]]         Anesthesia:  epidural anesthesia    Application and Delivery:  26 yo  at 40 3/7 weeks, IOL, cytotec, pitocin, AROM. Progressed well to complete.  over small 2nd degree lac. Placenta spont intact, uterus explored. Repair with 3-0 vicryl.  ml. Delivery Summary:  Labor & Delivery Summary  Labor Onset Date: 23  Labor Onset Time: 721     Intake/Output:     Date 23 0701 - 23 0700 23 0701 - 23 0700   Shift 2550-4289 1520-8829 24 Hour Total 5461-4319 3502-7859 24 Hour Total   INTAKE   I.V.  1842.4 1842.4 792.4  792.4   Shift Total  1842.4 1842.4 792.4  792.4   OUTPUT   Urine  1200 1200 325  325   Blood    250  250     Quantitative Blood Loss (mL)    250  250   Shift Total  1200 1200 575  575   NET  642.4 642.4 217.4  217.4       Condition:  infant stable to general nursery and mother stable    Blood Type and Rh: A POS        Rubella Immunity Status:   Unknown           Infant Feeding:    breast    Attending Attestation: I performed the procedure.

## 2023-05-01 NOTE — FLOWSHEET NOTE
Pt admitted to [facility] for IOL. Pt and family oriented to room, call light, and binder. Whiteboard updated, gown and urine collection container given. Pt aware of urine drug testing and signed written consent. EFM applied with consent to central monitor bank with alarms on. Uterus soft and non-tender. Pt reports fetal movement. Pt denies vaginal leakage of fluid or bleeding. IV started, infusing w/o difficulty, labs sent. Admission history obtained and appropriate consents reviewed and signed.

## 2023-05-01 NOTE — FLOWSHEET NOTE
RN at bedside. Pt ambulated independently to bathroom and voided 200ml of clear yellow urine w/o difficulty. Pt positioned in high fowlers.

## 2023-05-02 PROBLEM — Z98.890 STATUS POST INDUCTION OF LABOR: Status: RESOLVED | Noted: 2023-04-30 | Resolved: 2023-05-02

## 2023-05-02 PROBLEM — O99.612 CONSTIPATION DURING PREGNANCY IN SECOND TRIMESTER: Status: RESOLVED | Noted: 2020-02-17 | Resolved: 2023-05-02

## 2023-05-02 PROBLEM — K59.00 CONSTIPATION DURING PREGNANCY IN SECOND TRIMESTER: Status: RESOLVED | Noted: 2020-02-17 | Resolved: 2023-05-02

## 2023-05-02 PROCEDURE — 6370000000 HC RX 637 (ALT 250 FOR IP): Performed by: OBSTETRICS & GYNECOLOGY

## 2023-05-02 PROCEDURE — 1220000000 HC SEMI PRIVATE OB R&B

## 2023-05-02 RX ORDER — FAMOTIDINE 20 MG/1
20 TABLET, FILM COATED ORAL 2 TIMES DAILY
Status: DISCONTINUED | OUTPATIENT
Start: 2023-05-02 | End: 2023-05-03 | Stop reason: HOSPADM

## 2023-05-02 RX ADMIN — BENZOCAINE AND LEVOMENTHOL: 200; 5 SPRAY TOPICAL at 20:24

## 2023-05-02 RX ADMIN — IBUPROFEN 600 MG: 600 TABLET, FILM COATED ORAL at 04:35

## 2023-05-02 RX ADMIN — DOCUSATE SODIUM 100 MG: 100 CAPSULE, LIQUID FILLED ORAL at 08:17

## 2023-05-02 RX ADMIN — ACETAMINOPHEN 1000 MG: 500 TABLET ORAL at 08:16

## 2023-05-02 RX ADMIN — ACETAMINOPHEN 1000 MG: 500 TABLET ORAL at 01:16

## 2023-05-02 RX ADMIN — ACETAMINOPHEN 1000 MG: 500 TABLET ORAL at 20:23

## 2023-05-02 RX ADMIN — FAMOTIDINE 20 MG: 20 TABLET ORAL at 01:41

## 2023-05-02 RX ADMIN — IBUPROFEN 600 MG: 600 TABLET, FILM COATED ORAL at 16:47

## 2023-05-02 RX ADMIN — DOCUSATE SODIUM 100 MG: 100 CAPSULE, LIQUID FILLED ORAL at 20:23

## 2023-05-02 RX ADMIN — FAMOTIDINE 20 MG: 20 TABLET ORAL at 20:23

## 2023-05-02 ASSESSMENT — PAIN SCALES - GENERAL
PAINLEVEL_OUTOF10: 3
PAINLEVEL_OUTOF10: 3
PAINLEVEL_OUTOF10: 4
PAINLEVEL_OUTOF10: 5
PAINLEVEL_OUTOF10: 3

## 2023-05-02 ASSESSMENT — PAIN - FUNCTIONAL ASSESSMENT
PAIN_FUNCTIONAL_ASSESSMENT: ACTIVITIES ARE NOT PREVENTED

## 2023-05-02 ASSESSMENT — PAIN DESCRIPTION - LOCATION
LOCATION: VAGINA;PERINEUM
LOCATION: PERINEUM;VAGINA
LOCATION: PERINEUM

## 2023-05-02 ASSESSMENT — PAIN DESCRIPTION - DESCRIPTORS
DESCRIPTORS: SORE

## 2023-05-02 ASSESSMENT — PAIN DESCRIPTION - ORIENTATION
ORIENTATION: LOWER
ORIENTATION: LOWER

## 2023-05-02 NOTE — PLAN OF CARE
Problem: Pain  Goal: Verbalizes/displays adequate comfort level or baseline comfort level  2023 0447 by Phyllis Higgins RN  Outcome: Progressing  Flowsheets  Taken 2023 0400  Verbalizes/displays adequate comfort level or baseline comfort level:   Encourage patient to monitor pain and request assistance   Assess pain using appropriate pain scale  Taken 2023 2355  Verbalizes/displays adequate comfort level or baseline comfort level:   Encourage patient to monitor pain and request assistance   Assess pain using appropriate pain scale     Problem: Vaginal Birth or  Section  Goal: Fetal and maternal status remain reassuring during the birth process  Description:  Birth OB-Pregnancy care plan goal which identifies if the fetal and maternal status remain reassuring during the birth process  2023 1840 by Lucia Ocampo RN  Outcome: Completed     Problem: Postpartum  Goal: Experiences normal postpartum course  Description:  Postpartum OB-Pregnancy care plan goal which identifies if the mother is experiencing a normal postpartum course  2023 by Phyllis Higgins RN  Outcome: Progressing  20235 by Phyllis Higgins RN  Outcome: Progressing  2023 1840 by Lucia Ocampo RN  Outcome: Progressing     Problem: Postpartum  Goal: Appropriate maternal -  bonding  Description:  Postpartum OB-Pregnancy care plan goal which identifies if the mother and  are bonding appropriately  2023 by Phyllis Higgins RN  Outcome: Progressing     Problem: Postpartum  Goal: Establishment of infant feeding pattern  Description:  Postpartum OB-Pregnancy care plan goal which identifies if the mother is establishing a feeding pattern with their   2023 by Phyllis Higgins RN  Outcome: Progressing     Problem: Postpartum  Goal: Incisions, wounds, or drain sites healing without S/S of infection  2023 by Phyllis Higgins RN  Outcome: Progressing  Flowsheets  Taken 2023

## 2023-05-02 NOTE — PLAN OF CARE
Problem: Pain  Goal: Verbalizes/displays adequate comfort level or baseline comfort level  2023 by Cami Betancourt RN  Outcome: Progressing     Problem: Postpartum  Goal: Experiences normal postpartum course  Description:  Postpartum OB-Pregnancy care plan goal which identifies if the mother is experiencing a normal postpartum course  2023 by Cami Betancourt RN  Outcome: Progressing     Problem: Postpartum  Goal: Appropriate maternal -  bonding  Description:  Postpartum OB-Pregnancy care plan goal which identifies if the mother and  are bonding appropriately  2023 by Cami Betancourt RN  Outcome: Progressing     Problem: Postpartum  Goal: Establishment of infant feeding pattern  Description:  Postpartum OB-Pregnancy care plan goal which identifies if the mother is establishing a feeding pattern with their   2023 by Cami Betancourt RN  Outcome: Progressing     Problem: Postpartum  Goal: Incisions, wounds, or drain sites healing without S/S of infection  2023 by Cami Betancourt RN  Outcome: Progressing     Problem: Infection - Adult  Goal: Absence of infection at discharge  2023 by Cami Betancourt RN  Outcome: Progressing     Problem: Infection - Adult  Goal: Absence of fever/infection during anticipated neutropenic period  2023 by Cami Betancourt RN  Outcome: Progressing     Problem: Discharge Planning  Goal: Discharge to home or other facility with appropriate resources  2023 by Cami Betancourt RN  Outcome: Progressing  2023 by Serge So RN  Outcome: Progressing     Problem: Chronic Conditions and Co-morbidities  Goal: Patient's chronic conditions and co-morbidity symptoms are monitored and maintained or improved  2023 by Cami Betancourt RN  Outcome: Progressing  2023 184 by Serge So RN  Outcome: Progressing

## 2023-05-02 NOTE — ANESTHESIA POSTPROCEDURE EVALUATION
Department of Anesthesiology  Postprocedure Note    Patient: Trina Amaya  MRN: [id number]  YOB: 1990  Date of evaluation: 5/1/2023      Procedure Summary     Date: 05/01/23 Room / Location:     Anesthesia Start: 0520 Anesthesia Stop: Jody Sorianoolucijmonica 1    Procedure: Labor Analgesia Diagnosis:     Scheduled Providers:  Responsible Provider: Kimberlee Vigil MD    Anesthesia Type: epidural ASA Status: 2          Anesthesia Type: No value filed. Megha Phase I: Megha Score: 9    Megha Phase II:        Anesthesia Post Evaluation    Patient location during evaluation: floor  Patient participation: complete - patient participated  Level of consciousness: awake and alert  Pain score: 0  Airway patency: patent  Nausea & Vomiting: no vomiting and no nausea  Complications: no  Cardiovascular status: blood pressure returned to baseline and hemodynamically stable  Respiratory status: acceptable and room air  Hydration status: stable  Comments: Pt still very numb but states numbness has decreased some in her abdomen and pelvis. I will reevaluate in an hour.

## 2023-05-02 NOTE — PROGRESS NOTES
Pt moving legs much better. Sensation is improving. Not quite ready to stand but legs have much better strength.

## 2023-05-02 NOTE — ANESTHESIA POSTPROCEDURE EVALUATION
Department of Anesthesiology  Postprocedure Note    Patient: Anne Mittal  MRN: [id number]  YOB: 1990  Date of evaluation: 5/2/2023      Procedure Summary     Date: 05/01/23 Room / Location:     Anesthesia Start: 0520 Anesthesia Stop: Black Joseph 1    Procedure: Labor Analgesia Diagnosis:     Scheduled Providers:  Responsible Provider: Lianne Reilly MD    Anesthesia Type: epidural ASA Status: 2          Anesthesia Type: No value filed.     Megha Phase I: Megha Score: 9    Megha Phase II: Megha Score: 9      Anesthesia Post Evaluation    Patient location during evaluation: floor  Patient participation: complete - patient participated  Level of consciousness: awake and alert  Pain score: 2  Airway patency: patent  Nausea & Vomiting: no vomiting and no nausea  Complications: no  Cardiovascular status: hemodynamically stable  Respiratory status: room air, spontaneous ventilation and acceptable  Hydration status: stable

## 2023-05-02 NOTE — DISCHARGE INSTRUCTIONS
Thank you for the opportunity to care for you and your family. We hope we always exceeded your expectations and provided very good care during your stay in the [facility]. We want to ensure that you have the help you need when you leave the hospital. If there is anything we can assist you with please let us know. Please call and schedule an appointment to be seen by your obstetrician as scheduled. You will need to call and make your own appointment. For breastfeeding moms, you can contact our lactation consultants with any problems or questions. Please call [phone] for questions. Diet  Eat a well balanced diet focusing on foods high in fiber and protein. Drink plenty of fluids, especially water. To avoid constipation you may take a mild stool softener as recommended by your doctor or midwife. Activity  Gradually increase your activity. Resume exercise only after advise by your doctor or midwife. Avoid lifting anything heavier than your baby for 2 weeks. Avoid driving for 5-7 days or when not taking narcotics. Rise slowly from a lying to sitting and then a standing position. Climb stairs one at a time. Use caution when carrying your baby up and down the stairs. NO SEXUAL activity for 6 weeks or until advised by your doctor. Nothing in the vagina. No tampons, no douches and no intercourse. Be prepared to discuss family planning at your follow-up OB visit. You may feel tired or have a lack of energy. You may continue your prenatal vitamin to replenish nutrients post delivery. Nap when your baby naps to catch up on sleep. EMOTIONS  You may feel cavanaugh, sad, teary and/or overwhelmed. Contact your OB provider if you feel you may be showing signs of postpartum depression or have thoughts of harming yourself or your baby. If infant will not stop crying, contact another adult for help. Place the infant in his/her crib and step away for a break.   NEVER shake your

## 2023-05-02 NOTE — FLOWSHEET NOTE
Patient pain well controlled with motrin and tylenol, bleeding and vital signs within normal limits, will continue to monitor.

## 2023-05-02 NOTE — PROGRESS NOTES
Notified by RN at [id number] Pt needed assistance x2 people to ambulate to bathroom. Again at 0400 Pt able to ambulate to bathroom much better with only RN assistance. Per Pt she felt much more stable as well. Pt able to void without difficulty. I will have Ranjeet Sears CRNA see her again this morning. I think this is residual numbness as the Pt has continued to improve over time. D/W Pt plan and she understands and agree.

## 2023-05-02 NOTE — FLOWSHEET NOTE
Assessment completed at bedside. VSS. Fundus firm at midline. Pt with minimal bleeding. Pt eating, drinking and urinating well on own. Pain controlled at this time with PRN pain meds. Medication side effects discussed, mom without questions at this time. Discussed plan of care with pt and FOB. Bonding well. Call light within reach. No needs at this time. Encouraged pt to call with any needs.

## 2023-05-02 NOTE — FLOWSHEET NOTE
RN to bedside. Pt still cannot bear weight on left lower extremity. Movement/ mobility limited. Russell CRNA notified. Pt also stating having heartburn, Pepcid given at this time.

## 2023-05-02 NOTE — FLOWSHEET NOTE
Recovery ended at this time. Epidural removed, tip intact. No issues, pt tolerated well. Sabine care performed, gown and pads changed. Mom and baby doing well. Bonding well. Pt transferred to postpartum room 2263 via wheelchair. Pt unable to independently ambulate. Wellington Angelucci Steady and wheelchair used to transfer pt. Postpartum care and safe sleep education given, whiteboard updated, call light at bedside.  remains at bedside. Pt without needs at this time.

## 2023-05-02 NOTE — DISCHARGE SUMMARY
Department of Obstetrics and Gynecology  Postpartum Discharge Summary      Admit Date: 2023    Admit Diagnosis: Status post induction of labor [Z98.890]    Discharge Date:   5/3/23    Discharge Diagnoses: spontaneous vaginal delivery       Medication List        ASK your doctor about these medications      metoclopramide 10 MG tablet  Commonly known as: REGLAN  Take 1 tablet by mouth 3 times daily (with meals)              Service: Obstetrics    Consults: none    Significant Diagnostic Studies: none    Postpartum complications: none     Condition at Discharge: good    Hospital Course: uncomplicated    Soft nt    Discharge Instructions: Activity: no sex for 6 weeks and as tolerated    Diet: regular diet    Instructions: No intercourse and nothing in the vagina for 6 weeks. Do not drive while using pain medications.  Keep any wounds clean and dry    Discharge to: Home    Disposition / Follow up: Return to office in 6 weeks    Home Health Nurse visit within 24-48 h if qualifies    [place] Data:  Apgars:  Information for the patient's :  Rodo Sky [[id number]]   APGAR One: 8   Information for the patient's :  Rodosri Sky [[id number]]   APGAR Five: 9   Birth Weight:  Information for the patient's :  Rodosri Sky [[id number]]   Birth Weight: 10 lb 2.3 oz (4.6 kg)   Home with mother    Electronically signed by Yoon Diaz MD on 2023 at 12:21 PM

## 2023-05-03 VITALS
BODY MASS INDEX: 34.31 KG/M2 | DIASTOLIC BLOOD PRESSURE: 71 MMHG | OXYGEN SATURATION: 97 % | HEART RATE: 73 BPM | TEMPERATURE: 97.5 F | SYSTOLIC BLOOD PRESSURE: 114 MMHG | HEIGHT: 64 IN | WEIGHT: 201 LBS | RESPIRATION RATE: 18 BRPM

## 2023-05-03 PROCEDURE — 6370000000 HC RX 637 (ALT 250 FOR IP): Performed by: OBSTETRICS & GYNECOLOGY

## 2023-05-03 RX ADMIN — DOCUSATE SODIUM 100 MG: 100 CAPSULE, LIQUID FILLED ORAL at 08:36

## 2023-05-03 RX ADMIN — IBUPROFEN 600 MG: 600 TABLET, FILM COATED ORAL at 00:30

## 2023-05-03 ASSESSMENT — PAIN DESCRIPTION - RADICULAR PAIN: RADICULAR_PAIN: ABSENT

## 2023-05-03 ASSESSMENT — PAIN SCALES - GENERAL
PAINLEVEL_OUTOF10: 0
PAINLEVEL_OUTOF10: 5

## 2023-05-03 ASSESSMENT — PAIN - FUNCTIONAL ASSESSMENT: PAIN_FUNCTIONAL_ASSESSMENT: ACTIVITIES ARE NOT PREVENTED

## 2023-05-03 ASSESSMENT — PAIN DESCRIPTION - LOCATION: LOCATION: PERINEUM

## 2023-05-03 ASSESSMENT — PAIN DESCRIPTION - DESCRIPTORS: DESCRIPTORS: SORE

## 2023-05-03 NOTE — PLAN OF CARE
Problem: Pain  Goal: Verbalizes/displays adequate comfort level or baseline comfort level  5/3/2023 0735 by Lacey Dye RN  Outcome: Completed  Flowsheets  Taken 5/3/2023 0000 by Wiley Green RN  Verbalizes/displays adequate comfort level or baseline comfort level:   Encourage patient to monitor pain and request assistance   Assess pain using appropriate pain scale   Administer analgesics based on type and severity of pain and evaluate response   Consider cultural and social influences on pain and pain management   Notify Licensed Independent Practitioner if interventions unsuccessful or patient reports new pain   Implement non-pharmacological measures as appropriate and evaluate response  Taken 2023 by Wiley Green RN  Verbalizes/displays adequate comfort level or baseline comfort level:   Encourage patient to monitor pain and request assistance   Assess pain using appropriate pain scale  2023 by Wiley Green RN  Outcome: Progressing  Flowsheets  Taken 2023 1647 by Rudi Kwan RN  Verbalizes/displays adequate comfort level or baseline comfort level:   Encourage patient to monitor pain and request assistance   Assess pain using appropriate pain scale  Taken 2023 0816 by Rudi Kwan RN  Verbalizes/displays adequate comfort level or baseline comfort level:   Encourage patient to monitor pain and request assistance   Assess pain using appropriate pain scale     Problem: Postpartum  Goal: Experiences normal postpartum course  Description:  Postpartum OB-Pregnancy care plan goal which identifies if the mother is experiencing a normal postpartum course  5/3/2023 0735 by Lacey Dye RN  Outcome: Completed  2023 by Wiley Green RN  Outcome: Progressing  Goal: Appropriate maternal -  bonding  Description:  Postpartum OB-Pregnancy care plan goal which identifies if the mother and  are bonding appropriately  5/3/2023 3022 [facility] by Lacey Dye RN  Outcome:

## 2023-05-03 NOTE — PLAN OF CARE
Problem: Pain  Goal: Verbalizes/displays adequate comfort level or baseline comfort level  Outcome: Progressing  Flowsheets  Verbalizes/displays adequate comfort level or baseline comfort level:   Encourage patient to monitor pain and request assistance   Assess pain using appropriate pain scale    Problem: Postpartum  Goal: Experiences normal postpartum course  Description:  Postpartum OB-Pregnancy care plan goal which identifies if the mother is experiencing a normal postpartum course  Outcome: Progressing  Goal: Appropriate maternal -  bonding  Description:  Postpartum OB-Pregnancy care plan goal which identifies if the mother and  are bonding appropriately  Outcome: Progressing  Goal: Establishment of infant feeding pattern  Description:  Postpartum OB-Pregnancy care plan goal which identifies if the mother is establishing a feeding pattern with their   Outcome: Progressing  Goal: Incisions, wounds, or drain sites healing without S/S of infection  Outcome: Progressing  Problem: Infection - Adult  Goal: Absence of infection at discharge  Outcome: Progressing  Goal: Absence of infection during hospitalization  Outcome: Progressing  Goal: Absence of fever/infection during anticipated neutropenic period  Outcome: Progressing     Problem: Safety - Adult  Goal: Free from fall injury  Outcome: Progressing     Problem: Discharge Planning  Goal: Discharge to home or other facility with appropriate resources  Outcome: Progressing     Problem: Chronic Conditions and Co-morbidities  Goal: Patient's chronic conditions and co-morbidity symptoms are monitored and maintained or improved  Outcome: Progressing

## 2023-05-03 NOTE — FLOWSHEET NOTE
Postpartum and infant care teaching completed and forms signed by patient. Copy witnessed by RN and given to patient. Patient verbalized understanding of all teaching points. Patient plans to follow-up with [facility] Provider as instructed. Patient verbalizes understanding of discharge instructions and denies further questions. ID bands checked. Mother's ID band and one of baby's ID bands removed and taped to footprint sheet, signed by patient and witnessed by RN. Patient discharged in stable condition accompanied by family. Discharged in wheelchair, holding baby in arms.

## 2023-05-03 NOTE — FLOWSHEET NOTE
Pt currently satisfied with pain medication regime. Holding baby and denies any needs. Pt encouraged to call out with any requests.

## 2023-12-17 ENCOUNTER — OFFICE VISIT (OUTPATIENT)
Age: 33
End: 2023-12-17

## 2023-12-17 VITALS
HEIGHT: 64 IN | BODY MASS INDEX: 30.7 KG/M2 | HEART RATE: 91 BPM | SYSTOLIC BLOOD PRESSURE: 127 MMHG | RESPIRATION RATE: 19 BRPM | WEIGHT: 179.8 LBS | TEMPERATURE: 99.3 F | OXYGEN SATURATION: 96 % | DIASTOLIC BLOOD PRESSURE: 83 MMHG

## 2023-12-17 DIAGNOSIS — J00 NASOPHARYNGITIS: ICD-10-CM

## 2023-12-17 DIAGNOSIS — J02.9 PHARYNGITIS, UNSPECIFIED ETIOLOGY: Primary | ICD-10-CM

## 2023-12-17 LAB — STREPTOCOCCUS A RNA: NORMAL

## 2023-12-17 RX ORDER — AZITHROMYCIN 250 MG/1
250 TABLET, FILM COATED ORAL SEE ADMIN INSTRUCTIONS
Qty: 6 TABLET | Refills: 0 | Status: SHIPPED | OUTPATIENT
Start: 2023-12-17 | End: 2023-12-22